# Patient Record
Sex: FEMALE | Race: BLACK OR AFRICAN AMERICAN | NOT HISPANIC OR LATINO | URBAN - METROPOLITAN AREA
[De-identification: names, ages, dates, MRNs, and addresses within clinical notes are randomized per-mention and may not be internally consistent; named-entity substitution may affect disease eponyms.]

---

## 2021-12-02 ENCOUNTER — APPOINTMENT (RX ONLY)
Dept: URBAN - METROPOLITAN AREA CLINIC 117 | Facility: CLINIC | Age: 41
Setting detail: DERMATOLOGY
End: 2021-12-02

## 2021-12-02 DIAGNOSIS — L82.1 OTHER SEBORRHEIC KERATOSIS: ICD-10-CM

## 2021-12-02 DIAGNOSIS — L30.9 DERMATITIS, UNSPECIFIED: ICD-10-CM

## 2021-12-02 DIAGNOSIS — L90.5 SCAR CONDITIONS AND FIBROSIS OF SKIN: ICD-10-CM

## 2021-12-02 PROCEDURE — ? COUNSELING

## 2021-12-02 PROCEDURE — 99203 OFFICE O/P NEW LOW 30 MIN: CPT | Mod: 25

## 2021-12-02 PROCEDURE — ? BIOPSY BY PUNCH METHOD

## 2021-12-02 PROCEDURE — 11104 PUNCH BX SKIN SINGLE LESION: CPT

## 2021-12-02 ASSESSMENT — LOCATION ZONE DERM: LOCATION ZONE: FACE

## 2021-12-02 ASSESSMENT — LOCATION SIMPLE DESCRIPTION DERM
LOCATION SIMPLE: RIGHT CHEEK
LOCATION SIMPLE: LEFT CHEEK

## 2021-12-02 ASSESSMENT — LOCATION DETAILED DESCRIPTION DERM
LOCATION DETAILED: LEFT CENTRAL MALAR CHEEK
LOCATION DETAILED: LEFT INFERIOR MEDIAL MALAR CHEEK
LOCATION DETAILED: RIGHT INFERIOR CENTRAL MALAR CHEEK

## 2021-12-02 NOTE — PROCEDURE: BIOPSY BY PUNCH METHOD
Detail Level: Detailed
Was A Bandage Applied: Yes
Punch Size In Mm: 3
Biopsy Type: H and E
Anesthesia Type: 1% lidocaine with epinephrine
Anesthesia Volume In Cc (Will Not Render If 0): 1
Additional Anesthesia Volume In Cc (Will Not Render If 0): 0
Hemostasis: None
Epidermal Sutures: 4-0 Plain Gut
Wound Care: Vaseline
Dressing: bandage
Patient Will Remove Sutures At Home?: No
Lab: [facility]
Path Notes (To The Dermatopathologist): PAS STAINS.
Consent: Written consent was obtained and risks were reviewed including but not limited to scarring, infection, bleeding, scabbing, incomplete removal, nerve damage and allergy to anesthesia.
Post-Care Instructions: I reviewed with the patient in detail post-care instructions. Patient is to keep the biopsy site dry overnight, and then apply bacitracin twice daily until healed. Patient may apply hydrogen peroxide soaks to remove any crusting.
Home Suture Removal Text: Patient was provided a home suture removal kit and will remove their sutures at home. If they have any questions or difficulties they will call the office.
Notification Instructions: Patient will be notified of biopsy results. However, patient instructed to call the office if not contacted within 2 weeks.
Billing Type: [employer]
Information: Selecting Yes will display possible errors in your note based on the variables you have selected. This validation is only offered as a suggestion for you. PLEASE NOTE THAT THE VALIDATION TEXT WILL BE REMOVED WHEN YOU FINALIZE YOUR NOTE. IF YOU WANT TO FAX A PRELIMINARY NOTE YOU WILL NEED TO TOGGLE THIS TO 'NO' IF YOU DO NOT WANT IT IN YOUR FAXED NOTE.

## 2021-12-02 NOTE — PROCEDURE: MIPS QUALITY
Detail Level: Generalized
Additional Notes: Patient states they do not take any medications.
Quality 130: Documentation Of Current Medications In The Medical Record: Current Medications Documented

## 2021-12-16 ENCOUNTER — RX ONLY (OUTPATIENT)
Age: 41
Setting detail: RX ONLY
End: 2021-12-16

## 2021-12-16 RX ORDER — FLUTICASONE PROPIONATE 0.5 MG/G
1 CREAM TOPICAL BID
Qty: 60 | Refills: 2 | Status: ERX | COMMUNITY
Start: 2021-12-16

## 2021-12-16 RX ORDER — FLUTICASONE PROPIONATE 0.5 MG/G
CREAM TOPICAL BID
Qty: 60 | Refills: 2 | Status: CANCELLED

## 2021-12-16 RX ORDER — FLUTICASONE PROPIONATE 0.5 MG/G
CREAM TOPICAL BID
Qty: 60 | Refills: 2 | Status: ERX

## 2025-04-09 ENCOUNTER — NEW PATIENT (OUTPATIENT)
Age: 45
End: 2025-04-09

## 2025-04-09 VITALS
HEART RATE: 79 BPM | BODY MASS INDEX: 38.57 KG/M2 | SYSTOLIC BLOOD PRESSURE: 117 MMHG | DIASTOLIC BLOOD PRESSURE: 96 MMHG | WEIGHT: 240 LBS | HEIGHT: 66 IN

## 2025-04-09 DIAGNOSIS — H04.123: ICD-10-CM

## 2025-04-09 DIAGNOSIS — H25.13: ICD-10-CM

## 2025-04-09 DIAGNOSIS — H34.8310: ICD-10-CM

## 2025-04-09 PROCEDURE — 92004 COMPRE OPH EXAM NEW PT 1/>: CPT

## 2025-04-09 PROCEDURE — 92134 CPTRZ OPH DX IMG PST SGM RTA: CPT

## 2025-04-09 PROCEDURE — 92235 FLUORESCEIN ANGRPH MLTIFRAME: CPT

## 2025-04-09 PROCEDURE — 92250 FUNDUS PHOTOGRAPHY W/I&R: CPT | Mod: 59

## 2025-04-16 ENCOUNTER — CLINIC PROCEDURE ONLY (OUTPATIENT)
Age: 45
End: 2025-04-16

## 2025-04-16 DIAGNOSIS — H34.8310: ICD-10-CM

## 2025-04-16 PROCEDURE — J3490AVA AVASTIN *

## 2025-04-16 PROCEDURE — 67028 INJECTION EYE DRUG: CPT

## 2025-06-25 ENCOUNTER — CLINIC PROCEDURE ONLY (OUTPATIENT)
Age: 45
End: 2025-06-25

## 2025-06-25 DIAGNOSIS — H34.8310: ICD-10-CM

## 2025-06-25 PROCEDURE — 92134 CPTRZ OPH DX IMG PST SGM RTA: CPT | Mod: NC

## 2025-06-25 PROCEDURE — 92250 FUNDUS PHOTOGRAPHY W/I&R: CPT | Mod: NC

## 2025-07-01 ENCOUNTER — CLINIC PROCEDURE ONLY (OUTPATIENT)
Age: 45
End: 2025-07-01

## 2025-07-01 DIAGNOSIS — H34.8310: ICD-10-CM

## 2025-07-01 PROCEDURE — 67028 INJECTION EYE DRUG: CPT

## 2025-07-01 PROCEDURE — 92250 FUNDUS PHOTOGRAPHY W/I&R: CPT | Mod: 59

## 2025-07-01 PROCEDURE — 92134 CPTRZ OPH DX IMG PST SGM RTA: CPT

## 2025-07-01 PROCEDURE — J3490AVAJZ AVASTIN, NO DRUG WASTE

## 2025-08-21 ENCOUNTER — CLINIC PROCEDURE ONLY (OUTPATIENT)
Age: 45
End: 2025-08-21

## 2025-08-21 DIAGNOSIS — H34.8310: ICD-10-CM

## 2025-08-21 PROCEDURE — J3490AVAJZ AVASTIN, NO DRUG WASTE

## 2025-08-21 PROCEDURE — 92250 FUNDUS PHOTOGRAPHY W/I&R: CPT | Mod: 59

## 2025-08-21 PROCEDURE — 92134 CPTRZ OPH DX IMG PST SGM RTA: CPT

## 2025-08-21 PROCEDURE — 67028 INJECTION EYE DRUG: CPT
